# Patient Record
(demographics unavailable — no encounter records)

---

## 2024-11-22 NOTE — HISTORY OF PRESENT ILLNESS
[Mid-back] : mid-back [8] : 8 [Dull/Aching] : dull/aching [Localized] : localized [Intermittent] : intermittent [Exercising] : exercising [Student] : Work status: student [de-identified] : 10 y/o F with thoracic back pain x 1 week after fall onto lower back 2 weeks ago. Initial, lower back pain resolved. Denies numbness/tingling. denies bladder or bowel issues. She took motrin without relief.  denies DM. [] : Post Surgical Visit: no [FreeTextEntry5] : Patient slipped and fell down on her back about 2 weeks ago outside. still in pain. no prior hx. went to E.R did not bring x rays but was told everything was ok. [de-identified] : activity

## 2024-11-22 NOTE — ASSESSMENT
[FreeTextEntry1] : A/P Thoracic spasm/pain without radiculopathy - cont NSAIDS - PT - f/u spine 1 week

## 2024-11-22 NOTE — IMAGING
[de-identified] : PE thoracic spine: +tenderness to bilateral paraspinals, no midline tenderness, good ROM, able to SLR with pain, motor and sensory intact, NVI [No bony abnormalities] : No bony abnormalities

## 2024-12-03 NOTE — IMAGING
[de-identified] : [facility] Inspection: No rash or ecchymosis Palpation: Mild tenderness to palpation or spasm in bilateral thoracic and lumbar paraspinal musculature, no SI joint tenderness to palpation ROM: Full with no pain Strength: 5/5 bilateral hip flexors, knee extensors, ankle dorsiflexors, EHL, ankle plantarflexors Sensation: Sensation present to light touch bilateral L2-S1 distributions Provocative maneuvers: Negative bilateral straight leg raise [Straightening consistent with spasm] : Straightening consistent with spasm

## 2024-12-03 NOTE — HISTORY OF PRESENT ILLNESS
[de-identified] : MARU Davidson note- 12 y/o F with thoracic back pain x 1 week after fall onto lower back 2 weeks ago. Initial, lower back pain resolved. Denies numbness/tingling. denies bladder or bowel issues. She took motrin without relief. denies DM. ================================== 12/3/24: 12 yo female presenting with lower back pain after fall on cement in beginning of November. She presented to ER and had XRs completed DOI, no fx noted. She was seen by PA in [facility], was recommended to go to PT, which she has not yet started. She states pain is minimally improved from initial onset. Denies N/T.